# Patient Record
Sex: FEMALE
[De-identification: names, ages, dates, MRNs, and addresses within clinical notes are randomized per-mention and may not be internally consistent; named-entity substitution may affect disease eponyms.]

---

## 2021-01-13 ENCOUNTER — APPOINTMENT (OUTPATIENT)
Dept: UROLOGY | Facility: CLINIC | Age: 61
End: 2021-01-13
Payer: MEDICAID

## 2021-01-13 VITALS — TEMPERATURE: 98.7 F | HEART RATE: 80 BPM | DIASTOLIC BLOOD PRESSURE: 81 MMHG | SYSTOLIC BLOOD PRESSURE: 149 MMHG

## 2021-01-13 DIAGNOSIS — Z87.442 PERSONAL HISTORY OF URINARY CALCULI: ICD-10-CM

## 2021-01-13 PROCEDURE — 99072 ADDL SUPL MATRL&STAF TM PHE: CPT

## 2021-01-13 PROCEDURE — 99203 OFFICE O/P NEW LOW 30 MIN: CPT

## 2021-01-13 NOTE — REVIEW OF SYSTEMS
[see HPI] : see HPI [History of kidney stones] : history of kidney stones [Negative] : Heme/Lymph (4) no impairment

## 2021-01-19 LAB
APPEARANCE: CLEAR
BACTERIA UR CULT: NORMAL
BACTERIA: NEGATIVE
BILIRUBIN URINE: NEGATIVE
BLOOD URINE: NEGATIVE
COLOR: NORMAL
GLUCOSE QUALITATIVE U: NEGATIVE
HYALINE CASTS: 1 /LPF
KETONES URINE: NEGATIVE
LEUKOCYTE ESTERASE URINE: NEGATIVE
MICROSCOPIC-UA: NORMAL
NITRITE URINE: NEGATIVE
PH URINE: 7.5
PROTEIN URINE: NEGATIVE
RED BLOOD CELLS URINE: 1 /HPF
SPECIFIC GRAVITY URINE: 1.01
SQUAMOUS EPITHELIAL CELLS: 1 /HPF
UROBILINOGEN URINE: NORMAL
WHITE BLOOD CELLS URINE: 0 /HPF

## 2021-01-19 NOTE — LETTER BODY
[Dear  ___] : Dear  [unfilled], [Consult Letter:] : I had the pleasure of evaluating your patient, [unfilled]. [Please see my note below.] : Please see my note below. [Consult Closing:] : Thank you very much for allowing me to participate in the care of this patient.  If you have any questions, please do not hesitate to contact me. [FreeTextEntry3] : Sincerely,\par \par Karla Martinez MD\par Clinical \par [facility]\par [facility]\par

## 2021-01-19 NOTE — HISTORY OF PRESENT ILLNESS
[FreeTextEntry1] : KATI HENRY is a 60 year old F who presents with h/o stone and sx's of UTI. Has rec'd 2 rounds of antibiotics for "UTI" recently for presumed UTI based on dip with Nitofurantoin. \par \par NO GH, now with  right flank pain and last CT A/P on 12/26/18 showed no stones, hydro.  There is no concomitant n/v/f/c or rigors and she is voiding fine. She admits it is very hard at times to separate the symptoms of her kidney stone pain from her chronic back pain as she does have bulging discs at L4/L5 and S1/S2 which she struggles with.  Her last imaging was in 12/18 when she states there were no known stones that she is aware of. That imaging is not in our system.\par

## 2021-01-19 NOTE — ASSESSMENT
[FreeTextEntry1] : We had a lengthy discussion regarding UTI like sx's and true UTI, as well as the importance of coming when symptomatic. I also explained that if mixed or contaminated cultures, it would be best to obtain a catheterized specimen.  I explained the rationale behind that.  She demonstrated understanding. At this point, it is required that we obtain imaging to r/o a ureteral stone, as it is often very difficult to separate the symptoms of disc issues and stones.\par \par She knows that should she experience pain and rigors, f/c/n/v etc., she should head to the ER immediately.

## 2021-01-20 ENCOUNTER — RESULT REVIEW (OUTPATIENT)
Age: 61
End: 2021-01-20

## 2021-01-21 ENCOUNTER — APPOINTMENT (OUTPATIENT)
Dept: UROLOGY | Facility: CLINIC | Age: 61
End: 2021-01-21
Payer: MEDICAID

## 2021-01-21 ENCOUNTER — APPOINTMENT (OUTPATIENT)
Dept: FAMILY MEDICINE | Facility: CLINIC | Age: 61
End: 2021-01-21
Payer: MEDICAID

## 2021-01-21 VITALS
BODY MASS INDEX: 21.62 KG/M2 | HEIGHT: 63 IN | SYSTOLIC BLOOD PRESSURE: 122 MMHG | WEIGHT: 122 LBS | DIASTOLIC BLOOD PRESSURE: 78 MMHG | OXYGEN SATURATION: 97 % | HEART RATE: 92 BPM

## 2021-01-21 DIAGNOSIS — I99.9 UNSPECIFIED DISORDER OF CIRCULATORY SYSTEM: ICD-10-CM

## 2021-01-21 DIAGNOSIS — M81.0 AGE-RELATED OSTEOPOROSIS W/OUT CURRENT PATHOLOGICAL FRACTURE: ICD-10-CM

## 2021-01-21 DIAGNOSIS — K58.9 IRRITABLE BOWEL SYNDROME W/OUT DIARRHEA: ICD-10-CM

## 2021-01-21 DIAGNOSIS — R53.83 OTHER FATIGUE: ICD-10-CM

## 2021-01-21 DIAGNOSIS — N20.0 CALCULUS OF KIDNEY: ICD-10-CM

## 2021-01-21 DIAGNOSIS — Z00.00 ENCOUNTER FOR GENERAL ADULT MEDICAL EXAMINATION W/OUT ABNORMAL FINDINGS: ICD-10-CM

## 2021-01-21 DIAGNOSIS — R39.9 UNSPECIFIED SYMPTOMS AND SIGNS INVOLVING THE GENITOURINARY SYSTEM: ICD-10-CM

## 2021-01-21 DIAGNOSIS — M32.9 SYSTEMIC LUPUS ERYTHEMATOSUS, UNSPECIFIED: ICD-10-CM

## 2021-01-21 DIAGNOSIS — K63.5 POLYP OF COLON: ICD-10-CM

## 2021-01-21 DIAGNOSIS — M79.7 FIBROMYALGIA: ICD-10-CM

## 2021-01-21 DIAGNOSIS — R10.9 UNSPECIFIED ABDOMINAL PAIN: ICD-10-CM

## 2021-01-21 DIAGNOSIS — F43.22 ADJUSTMENT DISORDER WITH ANXIETY: ICD-10-CM

## 2021-01-21 DIAGNOSIS — I78.1 NEVUS, NON-NEOPLASTIC: ICD-10-CM

## 2021-01-21 DIAGNOSIS — Z80.0 FAMILY HISTORY OF MALIGNANT NEOPLASM OF DIGESTIVE ORGANS: ICD-10-CM

## 2021-01-21 PROCEDURE — 36415 COLL VENOUS BLD VENIPUNCTURE: CPT

## 2021-01-21 PROCEDURE — 51701 INSERT BLADDER CATHETER: CPT

## 2021-01-21 PROCEDURE — 99203 OFFICE O/P NEW LOW 30 MIN: CPT | Mod: 25

## 2021-01-21 PROCEDURE — 99072 ADDL SUPL MATRL&STAF TM PHE: CPT

## 2021-01-21 PROCEDURE — 99386 PREV VISIT NEW AGE 40-64: CPT | Mod: 25

## 2021-01-21 PROCEDURE — 99212 OFFICE O/P EST SF 10 MIN: CPT | Mod: 25

## 2021-01-21 RX ORDER — LINACLOTIDE 290 UG/1
290 CAPSULE, GELATIN COATED ORAL
Refills: 0 | Status: ACTIVE | COMMUNITY
Start: 2021-01-21

## 2021-01-21 NOTE — REVIEW OF SYSTEMS
[Abdominal Pain] : abdominal pain [Constipation] : constipation [Negative] : Heme/Lymph [Nausea] : no nausea [Diarrhea] : diarrhea [Vomiting] : no vomiting

## 2021-01-21 NOTE — PHYSICAL EXAM
[No Acute Distress] : no acute distress [Normal Sclera/Conjunctiva] : normal sclera/conjunctiva [EOMI] : extraocular movements intact [Normal Outer Ear/Nose] : the outer ears and nose were normal in appearance [No JVD] : no jugular venous distention [Normal] : no respiratory distress, lungs were clear to auscultation bilaterally and no accessory muscle use [No Edema] : there was no peripheral edema [Soft] : abdomen soft [Non Tender] : non-tender [Normal Bowel Sounds] : normal bowel sounds [Normal Anterior Cervical Nodes] : no anterior cervical lymphadenopathy [No CVA Tenderness] : no CVA  tenderness [No Joint Swelling] : no joint swelling [No Rash] : no rash [Coordination Grossly Intact] : coordination grossly intact [No Focal Deficits] : no focal deficits [Normal Gait] : normal gait [Alert and Oriented x3] : oriented to person, place, and time [Normal Insight/Judgement] : insight and judgment were intact [de-identified] : slender [de-identified] : paces while talking, appears worried, anxious, mistrustful

## 2021-01-21 NOTE — ASSESSMENT
[FreeTextEntry1] : 12/23/20 Cr 0.5/ ml/min, 123ml/min\par \par Wanted to know if renal function is normal. Will contact patient if any POSITIVE findings on urine. Otherwise, will call with CT results.

## 2021-01-21 NOTE — PLAN
[FreeTextEntry1] : Appears very anxious- refused meds or referral to therapist to address anxiety\par Advised of resources available, Allowed to vent, emotional support provided\par reduce vit d\par obtain medical records from previous pcp and specialists\par chk bw\par f/u w/ rheum, gi, vasc

## 2021-01-21 NOTE — HEALTH RISK ASSESSMENT
[Good] : ~his/her~  mood as  good [0] : 2) Feeling down, depressed, or hopeless: Not at all (0) [Patient reported mammogram was normal] : Patient reported mammogram was normal [Patient reported bone density results were abnormal] : Patient reported bone density results were abnormal [Patient reported colonoscopy was abnormal] : Patient reported colonoscopy was abnormal [Alone] : lives alone [Employed] : employed [Single] : single [Fully functional (bathing, dressing, toileting, transferring, walking, feeding)] : Fully functional (bathing, dressing, toileting, transferring, walking, feeding) [Fully functional (using the telephone, shopping, preparing meals, housekeeping, doing laundry, using] : Fully functional and needs no help or supervision to perform IADLs (using the telephone, shopping, preparing meals, housekeeping, doing laundry, using transportation, managing medications and managing finances) [Reports changes in hearing] : Reports no changes in hearing [Reports changes in vision] : Reports no changes in vision [BoneDensityDate] : 2019 [BoneDensityComments] : osteoporosis [ColonoscopyComments] : colon polyps [de-identified] : seeking roommates or other ppl to live with [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

## 2021-01-21 NOTE — HISTORY OF PRESENT ILLNESS
[FreeTextEntry1] : 60 year old female who presents today for a complete physical exam and multiple referrals[place]\par hx of colon polyps, constipation requiring her to take linzess 290mg, states close family member had colon ca, states she watches what she eats, avoiding high fat foods, cholesterol levels are good\par told to have low iron, was seeing hematologist, had received iron infusions\par rheum for lupus, was plaquenil, has been off of it for awhile\par states needs to see endo due to pituitary issue?\par cardio due to possible high bp?\par vascular due to varicose veins\par part vegetarian, would not take fiber nor stool softeners\par mammo done thanksgiving, pap done yesterday [de-identified] : states "meds ruin your liver and kidneys, and addresses health needs naturally"\par states needs her linzess as it is the only thing that helps her with having regular bowel movements\par avoid taking any other bowel meds including fiber or colace, feels it would be too much\par recently saw uro and was catherized, hx of kidney stones\par discusses her high vit d levels, plans on cutting down on vit d, was taking vit d 5000iu qd

## 2021-01-21 NOTE — HISTORY OF PRESENT ILLNESS
[FreeTextEntry1] : KATI HENRY is a 60 year old F who presents with having seen a gyn yesterday: placed on Metronidazole based on ph from exam yesterday. Here for catheterized specimen:  Some burning. There is also persistent right sided back pain and no fever, chills, nausea, vomiting. We reviewed her negative UA and fact that her urine cx was contaminated.\par \par She is worried about her back pain and is going to try and get her CT done today.

## 2021-01-24 ENCOUNTER — NON-APPOINTMENT (OUTPATIENT)
Age: 61
End: 2021-01-24

## 2021-01-24 LAB
APPEARANCE: CLEAR
BACTERIA UR CULT: NORMAL
BACTERIA: NEGATIVE
BILIRUBIN URINE: NEGATIVE
BLOOD URINE: NEGATIVE
COLOR: YELLOW
GLUCOSE QUALITATIVE U: NEGATIVE
HYALINE CASTS: 0 /LPF
KETONES URINE: NEGATIVE
LEUKOCYTE ESTERASE URINE: NEGATIVE
MICROSCOPIC-UA: NORMAL
NITRITE URINE: NEGATIVE
PH URINE: 6.5
PROTEIN URINE: NEGATIVE
RED BLOOD CELLS URINE: 7 /HPF
SPECIFIC GRAVITY URINE: 1.02
SQUAMOUS EPITHELIAL CELLS: 0 /HPF
UROBILINOGEN URINE: NORMAL
WHITE BLOOD CELLS URINE: 0 /HPF